# Patient Record
Sex: MALE | URBAN - METROPOLITAN AREA
[De-identification: names, ages, dates, MRNs, and addresses within clinical notes are randomized per-mention and may not be internally consistent; named-entity substitution may affect disease eponyms.]

---

## 2019-03-26 ENCOUNTER — APPOINTMENT (RX ONLY)
Dept: URBAN - METROPOLITAN AREA CLINIC 321 | Facility: CLINIC | Age: 74
Setting detail: DERMATOLOGY
End: 2019-03-26

## 2019-03-26 DIAGNOSIS — D17 BENIGN LIPOMATOUS NEOPLASM: ICD-10-CM

## 2019-03-26 DIAGNOSIS — D18.0 HEMANGIOMA: ICD-10-CM

## 2019-03-26 DIAGNOSIS — D22 MELANOCYTIC NEVI: ICD-10-CM

## 2019-03-26 DIAGNOSIS — L82.1 OTHER SEBORRHEIC KERATOSIS: ICD-10-CM

## 2019-03-26 DIAGNOSIS — L81.4 OTHER MELANIN HYPERPIGMENTATION: ICD-10-CM

## 2019-03-26 DIAGNOSIS — L57.0 ACTINIC KERATOSIS: ICD-10-CM

## 2019-03-26 DIAGNOSIS — L57.8 OTHER SKIN CHANGES DUE TO CHRONIC EXPOSURE TO NONIONIZING RADIATION: ICD-10-CM

## 2019-03-26 DIAGNOSIS — L82.0 INFLAMED SEBORRHEIC KERATOSIS: ICD-10-CM

## 2019-03-26 PROBLEM — D17.22 BENIGN LIPOMATOUS NEOPLASM OF SKIN AND SUBCUTANEOUS TISSUE OF LEFT ARM: Status: ACTIVE | Noted: 2019-03-26

## 2019-03-26 PROBLEM — D22.9 MELANOCYTIC NEVI, UNSPECIFIED: Status: ACTIVE | Noted: 2019-03-26

## 2019-03-26 PROBLEM — D18.01 HEMANGIOMA OF SKIN AND SUBCUTANEOUS TISSUE: Status: ACTIVE | Noted: 2019-03-26

## 2019-03-26 PROBLEM — L85.3 XEROSIS CUTIS: Status: ACTIVE | Noted: 2019-03-26

## 2019-03-26 PROCEDURE — ? COUNSELING

## 2019-03-26 PROCEDURE — ? LIQUID NITROGEN

## 2019-03-26 PROCEDURE — 17000 DESTRUCT PREMALG LESION: CPT | Mod: 59

## 2019-03-26 PROCEDURE — 17110 DESTRUCTION B9 LES UP TO 14: CPT

## 2019-03-26 PROCEDURE — 17003 DESTRUCT PREMALG LES 2-14: CPT

## 2019-03-26 PROCEDURE — ? INVENTORY

## 2019-03-26 PROCEDURE — 99213 OFFICE O/P EST LOW 20 MIN: CPT | Mod: 25

## 2019-03-26 ASSESSMENT — LOCATION SIMPLE DESCRIPTION DERM
LOCATION SIMPLE: ABDOMEN
LOCATION SIMPLE: CHEST
LOCATION SIMPLE: LEFT EAR
LOCATION SIMPLE: RIGHT UPPER BACK
LOCATION SIMPLE: LEFT UPPER ARM
LOCATION SIMPLE: RIGHT AXILLARY VAULT
LOCATION SIMPLE: RIGHT FOREARM
LOCATION SIMPLE: LEFT FOREHEAD
LOCATION SIMPLE: RIGHT EAR
LOCATION SIMPLE: LEFT SHOULDER
LOCATION SIMPLE: LEFT UPPER BACK
LOCATION SIMPLE: UPPER BACK

## 2019-03-26 ASSESSMENT — LOCATION DETAILED DESCRIPTION DERM
LOCATION DETAILED: RIGHT VENTRAL DISTAL FOREARM
LOCATION DETAILED: RIGHT RIB CAGE
LOCATION DETAILED: EPIGASTRIC SKIN
LOCATION DETAILED: LEFT LATERAL INFERIOR CHEST
LOCATION DETAILED: LEFT SCAPHA
LOCATION DETAILED: RIGHT SUPERIOR UPPER BACK
LOCATION DETAILED: RIGHT AXILLARY VAULT
LOCATION DETAILED: SUPERIOR THORACIC SPINE
LOCATION DETAILED: RIGHT ANTITRAGUS
LOCATION DETAILED: SUBXIPHOID
LOCATION DETAILED: LEFT POSTERIOR SHOULDER
LOCATION DETAILED: LEFT INFERIOR LATERAL FOREHEAD
LOCATION DETAILED: RIGHT LATERAL SUPERIOR CHEST
LOCATION DETAILED: LEFT INFERIOR UPPER BACK
LOCATION DETAILED: LEFT ANTERIOR PROXIMAL UPPER ARM
LOCATION DETAILED: LEFT MEDIAL SUPERIOR CHEST

## 2019-03-26 ASSESSMENT — LOCATION ZONE DERM
LOCATION ZONE: AXILLAE
LOCATION ZONE: ARM
LOCATION ZONE: FACE
LOCATION ZONE: EAR
LOCATION ZONE: TRUNK

## 2019-03-26 NOTE — PROCEDURE: LIQUID NITROGEN
Medical Necessity Clause: This procedure was medically necessary because the lesions that were treated were:
Add 52 Modifier (Optional): no
Render Post-Care Instructions In Note?: yes
Post-Care Instructions: I reviewed with the patient in detail post-care instructions. Patient is to wear sunprotection, and avoid picking at any of the treated lesions. Pt may apply Vaseline to crusted or scabbing areas. The patient was instructed to re-check treated lesion in one month at home to insure that area is gone. If not,  contact us for further evaluation.
Medical Necessity Information: It is in your best interest to select a reason for this procedure from the list below. All of these items fulfill various CMS LCD requirements except the new and changing color options.
Consent: The patient's consent was obtained including but not limited to risks of crusting, scabbing, blistering, scarring, darker or lighter pigmentary change, recurrence, incomplete removal and infection.
Detail Level: Simple
Detail Level: Zone
Post-Care Instructions: I reviewed with the patient in detail post-care instructions. Patient is to wear sunprotection, and avoid picking at any of the treated lesions. Pt may apply Vaseline to crusted or scabbing areas. The patient was instructed to check treated area in one month to insure that the area is gone.  If not , rtc for further evaluation.
Duration Of Freeze Thaw-Cycle (Seconds): 0

## 2019-04-23 ENCOUNTER — APPOINTMENT (RX ONLY)
Dept: URBAN - METROPOLITAN AREA CLINIC 321 | Facility: CLINIC | Age: 74
Setting detail: DERMATOLOGY
End: 2019-04-23

## 2019-04-23 DIAGNOSIS — D17 BENIGN LIPOMATOUS NEOPLASM: ICD-10-CM

## 2019-04-23 DIAGNOSIS — L72.0 EPIDERMAL CYST: ICD-10-CM

## 2019-04-23 DIAGNOSIS — L82.0 INFLAMED SEBORRHEIC KERATOSIS: ICD-10-CM

## 2019-04-23 PROBLEM — D17.22 BENIGN LIPOMATOUS NEOPLASM OF SKIN AND SUBCUTANEOUS TISSUE OF LEFT ARM: Status: ACTIVE | Noted: 2019-04-23

## 2019-04-23 PROCEDURE — ? COUNSELING

## 2019-04-23 PROCEDURE — 99213 OFFICE O/P EST LOW 20 MIN: CPT | Mod: 25

## 2019-04-23 PROCEDURE — 17110 DESTRUCTION B9 LES UP TO 14: CPT

## 2019-04-23 PROCEDURE — ? LIQUID NITROGEN

## 2019-04-23 ASSESSMENT — LOCATION DETAILED DESCRIPTION DERM
LOCATION DETAILED: RIGHT SUPERIOR MEDIAL UPPER BACK
LOCATION DETAILED: LEFT PROXIMAL POSTERIOR UPPER ARM
LOCATION DETAILED: LEFT SUPERIOR MEDIAL UPPER BACK
LOCATION DETAILED: LEFT MID-UPPER BACK
LOCATION DETAILED: RIGHT SUPERIOR LATERAL UPPER BACK
LOCATION DETAILED: LEFT SUPERIOR UPPER BACK
LOCATION DETAILED: RIGHT INFERIOR UPPER BACK
LOCATION DETAILED: PERIUMBILICAL SKIN

## 2019-04-23 ASSESSMENT — LOCATION SIMPLE DESCRIPTION DERM
LOCATION SIMPLE: LEFT UPPER BACK
LOCATION SIMPLE: ABDOMEN
LOCATION SIMPLE: LEFT UPPER ARM
LOCATION SIMPLE: RIGHT UPPER BACK

## 2019-04-23 ASSESSMENT — LOCATION ZONE DERM
LOCATION ZONE: ARM
LOCATION ZONE: TRUNK

## 2019-04-23 NOTE — HPI: SUBCUTANEOUS GROWTH
How Severe Is Your Growth?: mild
Has Your Growth Been Treated?: not been treated
Is This A New Presentation, Or A Follow-Up?: Subcutaneous Growths
Additional History: Pt states his oncologist had lumps X-ray but neg results

## 2019-04-23 NOTE — PROCEDURE: LIQUID NITROGEN
Medical Necessity Clause: This procedure was medically necessary because the lesions that were treated were:
Add 52 Modifier (Optional): no
Render Post-Care Instructions In Note?: yes
Post-Care Instructions: I reviewed with the patient in detail post-care instructions. Patient is to wear sunprotection, and avoid picking at any of the treated lesions. Pt may apply Vaseline to crusted or scabbing areas. The patient was instructed to re-check treated lesion in one month at home to insure that area is gone. If not,  contact us for further evaluation.
Medical Necessity Information: It is in your best interest to select a reason for this procedure from the list below. All of these items fulfill various CMS LCD requirements except the new and changing color options.
Detail Level: Simple
Consent: The patient's consent was obtained including but not limited to risks of crusting, scabbing, blistering, scarring, darker or lighter pigmentary change, recurrence, incomplete removal and infection.

## 2019-09-20 ENCOUNTER — NON-APPOINTMENT (OUTPATIENT)
Age: 74
End: 2019-09-20

## 2019-09-20 ENCOUNTER — APPOINTMENT (OUTPATIENT)
Dept: CARDIOLOGY | Facility: CLINIC | Age: 74
End: 2019-09-20
Payer: MEDICARE

## 2019-09-20 VITALS
HEIGHT: 72 IN | BODY MASS INDEX: 29.26 KG/M2 | DIASTOLIC BLOOD PRESSURE: 60 MMHG | WEIGHT: 216 LBS | OXYGEN SATURATION: 98 % | SYSTOLIC BLOOD PRESSURE: 92 MMHG | HEART RATE: 54 BPM

## 2019-09-20 DIAGNOSIS — Z86.79 PERSONAL HISTORY OF OTHER DISEASES OF THE CIRCULATORY SYSTEM: ICD-10-CM

## 2019-09-20 DIAGNOSIS — Z85.9 PERSONAL HISTORY OF MALIGNANT NEOPLASM, UNSPECIFIED: ICD-10-CM

## 2019-09-20 DIAGNOSIS — Z92.21 PERSONAL HISTORY OF ANTINEOPLASTIC CHEMOTHERAPY: ICD-10-CM

## 2019-09-20 DIAGNOSIS — Z78.9 OTHER SPECIFIED HEALTH STATUS: ICD-10-CM

## 2019-09-20 DIAGNOSIS — Z80.9 FAMILY HISTORY OF MALIGNANT NEOPLASM, UNSPECIFIED: ICD-10-CM

## 2019-09-20 DIAGNOSIS — Z87.891 PERSONAL HISTORY OF NICOTINE DEPENDENCE: ICD-10-CM

## 2019-09-20 DIAGNOSIS — Z60.2 PROBLEMS RELATED TO LIVING ALONE: ICD-10-CM

## 2019-09-20 DIAGNOSIS — I25.2 OLD MYOCARDIAL INFARCTION: ICD-10-CM

## 2019-09-20 PROCEDURE — 99205 OFFICE O/P NEW HI 60 MIN: CPT

## 2019-09-20 PROCEDURE — 93000 ELECTROCARDIOGRAM COMPLETE: CPT

## 2019-09-20 SDOH — SOCIAL STABILITY - SOCIAL INSECURITY: PROBLEMS RELATED TO LIVING ALONE: Z60.2

## 2019-09-20 NOTE — PHYSICAL EXAM
[General Appearance - Well Developed] : well developed [Normal Appearance] : normal appearance [Well Groomed] : well groomed [General Appearance - Well Nourished] : well nourished [No Deformities] : no deformities [General Appearance - In No Acute Distress] : no acute distress [Normal Conjunctiva] : the conjunctiva exhibited no abnormalities [Eyelids - No Xanthelasma] : the eyelids demonstrated no xanthelasmas [Normal Oral Mucosa] : normal oral mucosa [No Oral Pallor] : no oral pallor [No Oral Cyanosis] : no oral cyanosis [Normal Jugular Venous A Waves Present] : normal jugular venous A waves present [Normal Jugular Venous V Waves Present] : normal jugular venous V waves present [No Jugular Venous Hensley A Waves] : no jugular venous hensley A waves [Respiration, Rhythm And Depth] : normal respiratory rhythm and effort [Exaggerated Use Of Accessory Muscles For Inspiration] : no accessory muscle use [Auscultation Breath Sounds / Voice Sounds] : lungs were clear to auscultation bilaterally [Heart Rate And Rhythm] : heart rate and rhythm were normal [Heart Sounds] : normal S1 and S2 [Murmurs] : no murmurs present [Abdomen Soft] : soft [Abdomen Tenderness] : non-tender [Abdomen Mass (___ Cm)] : no abdominal mass palpated [Abnormal Walk] : normal gait [Gait - Sufficient For Exercise Testing] : the gait was sufficient for exercise testing [Nail Clubbing] : no clubbing of the fingernails [Cyanosis, Localized] : no localized cyanosis [Petechial Hemorrhages (___cm)] : no petechial hemorrhages [Skin Color & Pigmentation] : normal skin color and pigmentation [] : no rash [No Venous Stasis] : no venous stasis [Skin Lesions] : no skin lesions [No Skin Ulcers] : no skin ulcer [No Xanthoma] : no  xanthoma was observed [Oriented To Time, Place, And Person] : oriented to person, place, and time [Affect] : the affect was normal [Mood] : the mood was normal [No Anxiety] : not feeling anxious

## 2019-09-20 NOTE — ASSESSMENT
[FreeTextEntry1] : There is a high complexity to the medical decision making in this case.  There is an extensive number of diagnostic or management options regarding the chief complaint.  These include cardiac catheterization, EP study, Holter monitor, and stress test . The chief complaint is an illness which causes threat to life.\par \par All of the data entered by staff today into the electronic health record  has been reviewed.  I am in agreement with all of the data.\par \par CAD: The patient recently was told of congestive heart failure. Patient has evidence of myocardial infarction by ECG. The patient has depressed lv fxn by  prior left ventriculography.. Patient will not be able to exercise heart rate adequate for diagnostic purposes because of beta blockade therapy. Overall pharmacologic nuclear stress testing his best option. Transthoracic echocardiography is indicated to reassess LV function.\par \par I have advised the patient that propranolol is a suboptimal beta blocker choice. Given the patient's excellent clinical status we have elected to continue current therapy.

## 2019-09-20 NOTE — HISTORY OF PRESENT ILLNESS
[FreeTextEntry1] : The patient is seen because of coronary artery disease.\par \par Patient has a long history of coronary artery disease and cardiomyopathy. Status post coronary stenting. He is status post coronary stenting. The patient enjoys fairly good exercise ability. He is able to walk 10 minutes without exertional chest discomfort or shortness of breath.\par \par The patient was recently told that he suffers from congestive heart failure. He was treated with En patient denies any symptoms.\par \par The patient had lung cancer diagnosed in 2007 with metastases to the brain is 8. He is status post multiple radiological features to the brain. casey

## 2019-10-31 ENCOUNTER — APPOINTMENT (OUTPATIENT)
Dept: CARDIOLOGY | Facility: CLINIC | Age: 74
End: 2019-10-31
Payer: MEDICARE

## 2019-10-31 PROCEDURE — A9502: CPT

## 2019-10-31 PROCEDURE — 93015 CV STRESS TEST SUPVJ I&R: CPT

## 2019-10-31 PROCEDURE — 93306 TTE W/DOPPLER COMPLETE: CPT

## 2019-10-31 PROCEDURE — 78452 HT MUSCLE IMAGE SPECT MULT: CPT

## 2019-11-01 ENCOUNTER — APPOINTMENT (OUTPATIENT)
Dept: CARDIOLOGY | Facility: CLINIC | Age: 74
End: 2019-11-01
Payer: MEDICARE

## 2019-11-01 VITALS
WEIGHT: 222 LBS | HEIGHT: 72 IN | SYSTOLIC BLOOD PRESSURE: 104 MMHG | BODY MASS INDEX: 30.07 KG/M2 | HEART RATE: 63 BPM | OXYGEN SATURATION: 99 % | DIASTOLIC BLOOD PRESSURE: 62 MMHG

## 2019-11-01 PROCEDURE — 99214 OFFICE O/P EST MOD 30 MIN: CPT

## 2019-11-01 NOTE — ASSESSMENT
[FreeTextEntry1] : CHF:  He was told by MD in [place] he had congestive heart failure. The patient denies any recent symptoms of chest pain, shortness of breath, PND or orthopnea. His recent testing has been reviewed with him. Overall the patient declines invasive evaluation. He wishes to proceed with rehabilitation.\par \par Hypertension: Well controlled\par \par Hyperlipidemia: The patient has been on current statin therapy for many years. Quite stable. Elected to continue   current pharmacologic therapy.

## 2019-11-01 NOTE — PHYSICAL EXAM
[General Appearance - Well Developed] : well developed [Normal Appearance] : normal appearance [Well Groomed] : well groomed [General Appearance - Well Nourished] : well nourished [No Deformities] : no deformities [General Appearance - In No Acute Distress] : no acute distress [Normal Conjunctiva] : the conjunctiva exhibited no abnormalities [Eyelids - No Xanthelasma] : the eyelids demonstrated no xanthelasmas [Normal Oral Mucosa] : normal oral mucosa [No Oral Pallor] : no oral pallor [No Oral Cyanosis] : no oral cyanosis [Normal Jugular Venous A Waves Present] : normal jugular venous A waves present [Normal Jugular Venous V Waves Present] : normal jugular venous V waves present [No Jugular Venous Hensley A Waves] : no jugular venous hensley A waves [] : no respiratory distress [Respiration, Rhythm And Depth] : normal respiratory rhythm and effort [Exaggerated Use Of Accessory Muscles For Inspiration] : no accessory muscle use [Auscultation Breath Sounds / Voice Sounds] : lungs were clear to auscultation bilaterally [Heart Rate And Rhythm] : heart rate and rhythm were normal [Heart Sounds] : normal S1 and S2 [Murmurs] : no murmurs present

## 2019-11-01 NOTE — REASON FOR VISIT
[Follow-Up - Clinic] : a clinic follow-up of [Coronary Artery Disease] : coronary artery disease [FreeTextEntry1] : CHF:  He was told he had congestive heart failure by MD in [place]. The patient denies any recent symptoms of chest pain, shortness of breath, PND or orthopnea. His recent testing has been reviewed with him. Overall the patient declines invasive evaluation. He wishes to proceed with rehabilitation.\par \par Hypertension: Well controlled\par \par Hyperlipidemia: The patient has been on current statin therapy for many years. Quite stable. Elected to continue   current pharmacologic therapy.

## 2020-02-10 ENCOUNTER — APPOINTMENT (OUTPATIENT)
Dept: CARDIOLOGY | Facility: CLINIC | Age: 75
End: 2020-02-10
Payer: MEDICARE

## 2020-02-10 VITALS
BODY MASS INDEX: 30.07 KG/M2 | HEIGHT: 72 IN | SYSTOLIC BLOOD PRESSURE: 110 MMHG | WEIGHT: 222 LBS | HEART RATE: 56 BPM | DIASTOLIC BLOOD PRESSURE: 70 MMHG | OXYGEN SATURATION: 99 %

## 2020-02-10 PROCEDURE — 99214 OFFICE O/P EST MOD 30 MIN: CPT

## 2020-02-10 NOTE — HISTORY OF PRESENT ILLNESS
[FreeTextEntry1] : CHF: The patient is maintained on Entresto.  basic metabolic panel has been arranged. The patient is well compensated. The patient declines any further investigation in this regard. The risks and benefits AICD therapy have been reviewed. The patient declines.\par \par Hypertension: Well controlled.\par \par Hyperlipidemia: The patient is tolerating statin therapy without difficulty.\par \par CAD:  tolerating asa 81 daily.\par \par Lung Ca w brain mets:  s/p mult cyberknife procedures.

## 2020-02-10 NOTE — PHYSICAL EXAM
[Normal Appearance] : normal appearance [General Appearance - Well Developed] : well developed [General Appearance - Well Nourished] : well nourished [Well Groomed] : well groomed [No Deformities] : no deformities [General Appearance - In No Acute Distress] : no acute distress [Normal Conjunctiva] : the conjunctiva exhibited no abnormalities [Eyelids - No Xanthelasma] : the eyelids demonstrated no xanthelasmas [Normal Oral Mucosa] : normal oral mucosa [Normal Jugular Venous A Waves Present] : normal jugular venous A waves present [No Oral Pallor] : no oral pallor [No Oral Cyanosis] : no oral cyanosis [No Jugular Venous Hensley A Waves] : no jugular venous hensley A waves [Normal Jugular Venous V Waves Present] : normal jugular venous V waves present [] : no respiratory distress [Respiration, Rhythm And Depth] : normal respiratory rhythm and effort [Exaggerated Use Of Accessory Muscles For Inspiration] : no accessory muscle use [Heart Rate And Rhythm] : heart rate and rhythm were normal [Auscultation Breath Sounds / Voice Sounds] : lungs were clear to auscultation bilaterally [Murmurs] : no murmurs present [Heart Sounds] : normal S1 and S2

## 2020-02-12 ENCOUNTER — EMERGENCY (EMERGENCY)
Facility: HOSPITAL | Age: 75
LOS: 1 days | End: 2020-02-12
Admitting: EMERGENCY MEDICINE
Payer: MEDICARE

## 2020-02-12 PROCEDURE — 99284 EMERGENCY DEPT VISIT MOD MDM: CPT

## 2020-03-13 ENCOUNTER — APPOINTMENT (OUTPATIENT)
Dept: CT IMAGING | Facility: CLINIC | Age: 75
End: 2020-03-13
Payer: MEDICARE

## 2020-03-13 PROCEDURE — Q9967B: CUSTOM

## 2020-03-13 PROCEDURE — 82565A: CUSTOM | Mod: QW

## 2020-03-13 PROCEDURE — 74177 CT ABD & PELVIS W/CONTRAST: CPT

## 2020-03-13 PROCEDURE — 71260 CT THORAX DX C+: CPT

## 2020-03-19 ENCOUNTER — APPOINTMENT (OUTPATIENT)
Dept: CARDIOLOGY | Facility: CLINIC | Age: 75
End: 2020-03-19
Payer: MEDICARE

## 2020-03-19 ENCOUNTER — NON-APPOINTMENT (OUTPATIENT)
Age: 75
End: 2020-03-19

## 2020-03-19 VITALS
HEART RATE: 90 BPM | BODY MASS INDEX: 28.04 KG/M2 | HEIGHT: 72 IN | SYSTOLIC BLOOD PRESSURE: 120 MMHG | DIASTOLIC BLOOD PRESSURE: 70 MMHG | WEIGHT: 207 LBS | OXYGEN SATURATION: 95 %

## 2020-03-19 DIAGNOSIS — Z00.00 ENCOUNTER FOR GENERAL ADULT MEDICAL EXAMINATION W/OUT ABNORMAL FINDINGS: ICD-10-CM

## 2020-03-19 DIAGNOSIS — G20 PARKINSON'S DISEASE: ICD-10-CM

## 2020-03-19 PROCEDURE — 93000 ELECTROCARDIOGRAM COMPLETE: CPT

## 2020-03-19 PROCEDURE — 99215 OFFICE O/P EST HI 40 MIN: CPT

## 2020-03-19 RX ORDER — DICYCLOMINE HYDROCHLORIDE 10 MG/1
10 CAPSULE ORAL
Qty: 90 | Refills: 0 | Status: ACTIVE | COMMUNITY
Start: 2020-03-17

## 2020-03-19 RX ORDER — CLONAZEPAM 1 MG/1
1 TABLET ORAL
Qty: 90 | Refills: 0 | Status: ACTIVE | COMMUNITY
Start: 2020-02-06

## 2020-03-19 NOTE — HISTORY OF PRESENT ILLNESS
[Preoperative Visit] : for a medical evaluation prior to surgery [Scheduled Procedure ___] : a [unfilled] [Date of Surgery ___] : on [unfilled] [Surgeon Name ___] : surgeon: [unfilled] [Stable] : Stable [Fatigue] : fatigue [Poor Exercise Tolerance] : poor exercise tolerance [Cardiovascular Disease] : cardiovascular disease [Anti-Platelet Agents] : anti-platelet agents [GI Disease] : gastrointestinal disease [Thromboembolic Problems] : thromboembolic problems [Frequent Aspirin Use] : frequent aspirin use [Prior Anesthesia] : Prior anesthesia [Electrocardiogram] : ~T an ECG ~C was performed [Echocardiogram] : ~T an echocardiogram ~C was performed [Cardiac Catheterization  (Diagnostic)] : cardiac catheterization ~T ~C was performed [Fever] : no fever [Chills] : no chills [Chest Pain] : no chest pain [Cough] : no cough [Dyspnea] : no dyspnea [Dysuria] : no dysuria [Urinary Frequency] : no urinary frequency [Nausea] : no nausea [Vomiting] : no vomiting [Diarrhea] : no diarrhea [Abdominal Pain] : no abdominal pain [Easy Bruising] : no easy bruising [Lower Extremity Swelling] : no lower extremity swelling [Diabetes] : no diabetes [Pulmonary Disease] : no pulmonary disease [Nicotine Dependence] : no nicotine dependence [Alcohol Use] : no  alcohol use [Renal Disease] : no renal disease [Sleep Apnea] : no sleep apnea [Frequent use of NSAIDs] : no use of NSAIDs [Transfusion Reaction] : no transfusion reaction [Impaired Immunity] : no impaired immunity [Steroid Use in Last 6 Months] : no steroid use in the last six months [Prev Anesthesia Reaction] : no previous anesthesia reaction [Anesthesia Reaction] : no anesthesia reaction [Sudden Death] : no sudden death [Clotting Disorder] : no clotting disorder [Bleeding Disorder] : no bleeding disorder [FreeTextEntry1] : CHF: The patient is maintained on Entresto.  The patient is well compensated. The patient declines any further investigation in this regard. The risks and benefits AICD therapy have been reviewed. The patient declines.\par \par Hypertension: Well controlled.\par \par Hyperlipidemia: The patient is tolerating statin therapy without difficulty.\par \par CAD:  tolerating asa 81 daily.\par \par Lung Ca w brain mets:  s/p mult cyberknife procedures.  \par \par he has no chest pain\par He has no shortness of breath\par He has no palpitations\par He has no syncope\par He is neurologically intact\par He has no edema\par He has no GI symptoms\par

## 2020-03-19 NOTE — DISCUSSION/SUMMARY
[Procedure Low Risk] : the procedure risk is low [Patient Intermediate Risk] : the patient is an intermediate risk [Optimized for Surgery] : the patient is optimized for surgery [As per surgery] : as per surgery [Continue] : Continue medications as currently directed [FreeTextEntry1] : Cleared for colonoscopy\par Must remain on aspirin 81mg ( stents)

## 2020-03-20 ENCOUNTER — OUTPATIENT (OUTPATIENT)
Dept: OUTPATIENT SERVICES | Facility: HOSPITAL | Age: 75
LOS: 1 days | End: 2020-03-20

## 2020-09-08 ENCOUNTER — APPOINTMENT (OUTPATIENT)
Dept: CARDIOLOGY | Facility: CLINIC | Age: 75
End: 2020-09-08
Payer: MEDICARE

## 2020-09-08 VITALS
TEMPERATURE: 96.9 F | DIASTOLIC BLOOD PRESSURE: 64 MMHG | WEIGHT: 205 LBS | HEIGHT: 72 IN | HEART RATE: 54 BPM | SYSTOLIC BLOOD PRESSURE: 102 MMHG | OXYGEN SATURATION: 98 % | BODY MASS INDEX: 27.77 KG/M2

## 2020-09-08 PROCEDURE — 99213 OFFICE O/P EST LOW 20 MIN: CPT

## 2020-09-08 NOTE — HISTORY OF PRESENT ILLNESS
[FreeTextEntry1] : CAD: Patient status post coronary stenting.  The patient has fairly good exercise ability without exertional symptoms.\par \par Hyperlipidemia: Patient has been tolerating statin therapy for quite some time will continue current therapy.\par \par Hypertension: Well-controlled.\par \par Cardiomyopathy: The risk minutes of further investigation have been reviewed.  Overall the patient declines.

## 2020-09-29 ENCOUNTER — RX RENEWAL (OUTPATIENT)
Age: 75
End: 2020-09-29

## 2020-11-24 ENCOUNTER — APPOINTMENT (OUTPATIENT)
Dept: MRI IMAGING | Facility: CLINIC | Age: 75
End: 2020-11-24
Payer: MEDICARE

## 2020-11-24 PROCEDURE — A9585A: CUSTOM

## 2020-11-24 PROCEDURE — 70553 MRI BRAIN STEM W/O & W/DYE: CPT | Mod: MH

## 2021-01-05 ENCOUNTER — NON-APPOINTMENT (OUTPATIENT)
Age: 76
End: 2021-01-05

## 2021-02-25 ENCOUNTER — RX RENEWAL (OUTPATIENT)
Age: 76
End: 2021-02-25

## 2021-03-17 ENCOUNTER — APPOINTMENT (OUTPATIENT)
Dept: MRI IMAGING | Facility: CLINIC | Age: 76
End: 2021-03-17
Payer: MEDICARE

## 2021-03-17 PROCEDURE — A9585A: CUSTOM

## 2021-03-17 PROCEDURE — 70553 MRI BRAIN STEM W/O & W/DYE: CPT | Mod: MH

## 2021-03-17 PROCEDURE — A9585: CPT | Mod: JW

## 2021-08-31 ENCOUNTER — APPOINTMENT (OUTPATIENT)
Dept: MRI IMAGING | Facility: CLINIC | Age: 76
End: 2021-08-31
Payer: MEDICARE

## 2021-08-31 PROCEDURE — 70553 MRI BRAIN STEM W/O & W/DYE: CPT | Mod: MH

## 2021-08-31 PROCEDURE — A9585: CPT

## 2021-11-12 ENCOUNTER — APPOINTMENT (OUTPATIENT)
Dept: CARDIOLOGY | Facility: CLINIC | Age: 76
End: 2021-11-12
Payer: MEDICARE

## 2021-11-12 VITALS
HEART RATE: 63 BPM | OXYGEN SATURATION: 97 % | HEIGHT: 72 IN | SYSTOLIC BLOOD PRESSURE: 98 MMHG | BODY MASS INDEX: 31.83 KG/M2 | WEIGHT: 235 LBS | TEMPERATURE: 97 F | DIASTOLIC BLOOD PRESSURE: 64 MMHG

## 2021-11-12 PROCEDURE — 93000 ELECTROCARDIOGRAM COMPLETE: CPT

## 2021-11-12 PROCEDURE — 99214 OFFICE O/P EST MOD 30 MIN: CPT

## 2021-11-12 RX ORDER — CARBIDOPA AND LEVODOPA 25; 100 MG/1; MG/1
25-100 TABLET ORAL 3 TIMES DAILY
Refills: 0 | Status: DISCONTINUED | COMMUNITY
End: 2021-11-12

## 2021-11-12 NOTE — HISTORY OF PRESENT ILLNESS
[FreeTextEntry1] : CAD: The patient is status post multivessel coronary intervention.  He is tolerating aspirin therapy.  The patient has some dyspnea on heavy exertion.  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  The risk and benefits of further cardiac evaluation and treatment have been reviewed.  The patient declines at this time but will consider it in the future.\par \par Hypertension: Well-controlled.

## 2022-01-03 ENCOUNTER — NON-APPOINTMENT (OUTPATIENT)
Age: 77
End: 2022-01-03

## 2022-01-11 ENCOUNTER — NON-APPOINTMENT (OUTPATIENT)
Age: 77
End: 2022-01-11

## 2022-02-15 ENCOUNTER — APPOINTMENT (OUTPATIENT)
Dept: MRI IMAGING | Facility: CLINIC | Age: 77
End: 2022-02-15
Payer: MEDICARE

## 2022-02-15 PROCEDURE — 70553 MRI BRAIN STEM W/O & W/DYE: CPT

## 2022-02-15 PROCEDURE — A9585: CPT

## 2022-02-21 RX ORDER — SACUBITRIL AND VALSARTAN 97; 103 MG/1; MG/1
97-103 TABLET, FILM COATED ORAL TWICE DAILY
Qty: 180 | Refills: 3 | Status: ACTIVE | COMMUNITY
Start: 1900-01-01 | End: 1900-01-01

## 2022-09-10 ENCOUNTER — EMERGENCY (EMERGENCY)
Facility: HOSPITAL | Age: 77
LOS: 1 days | Discharge: HOME | End: 2022-09-10

## 2022-09-10 DIAGNOSIS — R33.9 RETENTION OF URINE, UNSPECIFIED: ICD-10-CM

## 2022-09-10 DIAGNOSIS — I50.9 HEART FAILURE, UNSPECIFIED: ICD-10-CM

## 2022-09-10 PROCEDURE — 99284 EMERGENCY DEPT VISIT MOD MDM: CPT

## 2022-09-12 ENCOUNTER — APPOINTMENT (OUTPATIENT)
Dept: UROLOGY | Facility: CLINIC | Age: 77
End: 2022-09-12

## 2022-09-12 VITALS
HEART RATE: 83 BPM | HEIGHT: 72 IN | TEMPERATURE: 97.9 F | BODY MASS INDEX: 31.83 KG/M2 | SYSTOLIC BLOOD PRESSURE: 124 MMHG | DIASTOLIC BLOOD PRESSURE: 83 MMHG | WEIGHT: 235 LBS

## 2022-09-12 PROCEDURE — 99204 OFFICE O/P NEW MOD 45 MIN: CPT

## 2022-09-12 NOTE — ASSESSMENT
[FreeTextEntry1] : Plan\par increase tamsulosin 2 capsules a day\par c/w dutasteride\par fu Thursday AM TOV

## 2022-09-12 NOTE — PHYSICAL EXAM

## 2022-09-12 NOTE — HISTORY OF PRESENT ILLNESS
[FreeTextEntry1] : Mr. DANICA ETIENNE 77 year old  M  PMH LUTS and urinary retention, HLD, CHF and PSH brain tumor. Pt comes in bc he had a catheter placed on Saturday at [facility]. Pt has a a history of urinary retention. This happening in 2007. Pt currently on flomax and dutasteride. Pt has been on this for 5 years. Friday night had a beer which isn't uncommon. Pt went to ED Saturday morning. \par

## 2022-09-15 ENCOUNTER — APPOINTMENT (OUTPATIENT)
Dept: UROLOGY | Facility: CLINIC | Age: 77
End: 2022-09-15

## 2022-09-15 VITALS
SYSTOLIC BLOOD PRESSURE: 104 MMHG | WEIGHT: 235 LBS | HEIGHT: 72 IN | TEMPERATURE: 97.2 F | DIASTOLIC BLOOD PRESSURE: 67 MMHG | BODY MASS INDEX: 31.83 KG/M2 | HEART RATE: 66 BPM

## 2022-09-15 PROCEDURE — 51700 IRRIGATION OF BLADDER: CPT

## 2022-09-15 PROCEDURE — A4216: CPT | Mod: NC

## 2022-09-22 ENCOUNTER — APPOINTMENT (OUTPATIENT)
Dept: UROLOGY | Facility: CLINIC | Age: 77
End: 2022-09-22

## 2022-09-22 VITALS
TEMPERATURE: 97.4 F | SYSTOLIC BLOOD PRESSURE: 106 MMHG | DIASTOLIC BLOOD PRESSURE: 70 MMHG | BODY MASS INDEX: 31.97 KG/M2 | HEART RATE: 56 BPM | WEIGHT: 236 LBS | HEIGHT: 72 IN

## 2022-09-22 DIAGNOSIS — R33.8 OTHER RETENTION OF URINE: ICD-10-CM

## 2022-09-22 PROCEDURE — 51798 US URINE CAPACITY MEASURE: CPT

## 2022-09-22 PROCEDURE — 99213 OFFICE O/P EST LOW 20 MIN: CPT

## 2022-09-22 NOTE — HISTORY OF PRESENT ILLNESS
[FreeTextEntry1] : Pt comes in PVR following justin removal. Pt feels well and urinating well.\par \par PVR 30

## 2022-10-28 ENCOUNTER — RX RENEWAL (OUTPATIENT)
Age: 77
End: 2022-10-28

## 2022-11-08 ENCOUNTER — NON-APPOINTMENT (OUTPATIENT)
Age: 77
End: 2022-11-08

## 2022-12-08 ENCOUNTER — APPOINTMENT (OUTPATIENT)
Dept: CARDIOLOGY | Facility: CLINIC | Age: 77
End: 2022-12-08

## 2022-12-08 ENCOUNTER — NON-APPOINTMENT (OUTPATIENT)
Age: 77
End: 2022-12-08

## 2022-12-08 VITALS
OXYGEN SATURATION: 98 % | TEMPERATURE: 97.2 F | HEART RATE: 64 BPM | HEIGHT: 72 IN | BODY MASS INDEX: 29.53 KG/M2 | WEIGHT: 218 LBS | SYSTOLIC BLOOD PRESSURE: 130 MMHG | DIASTOLIC BLOOD PRESSURE: 74 MMHG

## 2022-12-08 DIAGNOSIS — E78.5 HYPERLIPIDEMIA, UNSPECIFIED: ICD-10-CM

## 2022-12-08 DIAGNOSIS — I50.22 CHRONIC SYSTOLIC (CONGESTIVE) HEART FAILURE: ICD-10-CM

## 2022-12-08 DIAGNOSIS — Z95.5 PRESENCE OF CORONARY ANGIOPLASTY IMPLANT AND GRAFT: ICD-10-CM

## 2022-12-08 PROCEDURE — 99214 OFFICE O/P EST MOD 30 MIN: CPT

## 2022-12-08 NOTE — ASSESSMENT
[FreeTextEntry1] : CAD: The patient status post coronary stenting.  There is no chest pain.  There is no shortness of breath.\par \par Lung cancer: The patient has non-small cell carcinoma.  There was metastatic disease to the brain in 2009.  The diagnosis was in 2008.  The patient is undergone multiple brain procedures including CyberKnife and surgical treatment.\par \par Hyperlipidemia: Continue statin therapy.\par \par Cardiomyopathy: The risk and benefits of further imaging and AICD consideration have been reviewed.  Overall we have elected to continue observation at this time given the overall clinical circumstance.

## 2023-01-03 ENCOUNTER — RX RENEWAL (OUTPATIENT)
Age: 78
End: 2023-01-03

## 2023-02-15 ENCOUNTER — APPOINTMENT (OUTPATIENT)
Dept: MRI IMAGING | Facility: CLINIC | Age: 78
End: 2023-02-15
Payer: MEDICARE

## 2023-02-15 PROCEDURE — 70553 MRI BRAIN STEM W/O & W/DYE: CPT

## 2023-02-15 PROCEDURE — A9585: CPT | Mod: JW

## 2023-03-21 ENCOUNTER — APPOINTMENT (OUTPATIENT)
Dept: UROLOGY | Facility: CLINIC | Age: 78
End: 2023-03-21
Payer: MEDICARE

## 2023-03-21 VITALS
TEMPERATURE: 98.3 F | HEART RATE: 52 BPM | HEIGHT: 72 IN | SYSTOLIC BLOOD PRESSURE: 101 MMHG | DIASTOLIC BLOOD PRESSURE: 69 MMHG | BODY MASS INDEX: 28.44 KG/M2 | WEIGHT: 210 LBS

## 2023-03-21 PROCEDURE — 99213 OFFICE O/P EST LOW 20 MIN: CPT

## 2023-03-21 NOTE — HISTORY OF PRESENT ILLNESS
[FreeTextEntry1] : Pt comes in follow up LUTS. Pt feels he is doing well and urinating well. Offers no complaints. \par \par PVR 16\par

## 2023-03-24 LAB
APPEARANCE: CLEAR
BACTERIA: ABNORMAL
BILIRUBIN URINE: NEGATIVE
BLOOD URINE: NEGATIVE
COLOR: YELLOW
GLUCOSE QUALITATIVE U: NEGATIVE
HYALINE CASTS: 0 /LPF
KETONES URINE: NEGATIVE
LEUKOCYTE ESTERASE URINE: ABNORMAL
MICROSCOPIC-UA: NORMAL
NITRITE URINE: POSITIVE
PH URINE: 5.5
PROTEIN URINE: NORMAL
RED BLOOD CELLS URINE: 1 /HPF
SPECIFIC GRAVITY URINE: 1.02
SQUAMOUS EPITHELIAL CELLS: 1 /HPF
UROBILINOGEN URINE: NORMAL
WHITE BLOOD CELLS URINE: 12 /HPF

## 2023-03-28 ENCOUNTER — APPOINTMENT (OUTPATIENT)
Dept: UROLOGY | Facility: CLINIC | Age: 78
End: 2023-03-28

## 2023-03-31 ENCOUNTER — RESULT CHARGE (OUTPATIENT)
Age: 78
End: 2023-03-31

## 2023-03-31 LAB
BILIRUB UR QL STRIP: NEGATIVE
CLARITY UR: CLEAR
COLLECTION METHOD: NORMAL
GLUCOSE UR-MCNC: NEGATIVE
HCG UR QL: 0.2 EU/DL
HGB UR QL STRIP.AUTO: NORMAL
KETONES UR-MCNC: NORMAL
LEUKOCYTE ESTERASE UR QL STRIP: NORMAL
NITRITE UR QL STRIP: POSITIVE
PH UR STRIP: 5.5
PROT UR STRIP-MCNC: 30
SP GR UR STRIP: 1.02

## 2023-04-07 LAB
APPEARANCE: ABNORMAL
BACTERIA UR CULT: ABNORMAL
BACTERIA: ABNORMAL
BILIRUBIN URINE: NEGATIVE
BLOOD URINE: ABNORMAL
COLOR: ABNORMAL
GLUCOSE QUALITATIVE U: NEGATIVE
HYALINE CASTS: 1 /LPF
KETONES URINE: NEGATIVE
LEUKOCYTE ESTERASE URINE: ABNORMAL
MICROSCOPIC-UA: NORMAL
NITRITE URINE: POSITIVE
PH URINE: 6
PROTEIN URINE: ABNORMAL
RED BLOOD CELLS URINE: 1 /HPF
SPECIFIC GRAVITY URINE: 1.03
SQUAMOUS EPITHELIAL CELLS: 0 /HPF
UROBILINOGEN URINE: NORMAL
WHITE BLOOD CELLS URINE: 21 /HPF

## 2023-06-27 ENCOUNTER — NON-APPOINTMENT (OUTPATIENT)
Age: 78
End: 2023-06-27

## 2023-07-24 ENCOUNTER — APPOINTMENT (OUTPATIENT)
Dept: CARDIOLOGY | Facility: CLINIC | Age: 78
End: 2023-07-24
Payer: MEDICARE

## 2023-07-24 VITALS
WEIGHT: 212 LBS | HEART RATE: 57 BPM | OXYGEN SATURATION: 97 % | DIASTOLIC BLOOD PRESSURE: 66 MMHG | SYSTOLIC BLOOD PRESSURE: 102 MMHG | HEIGHT: 72 IN | BODY MASS INDEX: 28.71 KG/M2

## 2023-07-24 DIAGNOSIS — I10 ESSENTIAL (PRIMARY) HYPERTENSION: ICD-10-CM

## 2023-07-24 DIAGNOSIS — I25.10 ATHEROSCLEROTIC HEART DISEASE OF NATIVE CORONARY ARTERY W/OUT ANGINA PECTORIS: ICD-10-CM

## 2023-07-24 PROCEDURE — 99213 OFFICE O/P EST LOW 20 MIN: CPT

## 2023-07-24 RX ORDER — DICYCLOMINE HYDROCHLORIDE 20 MG/1
20 TABLET ORAL
Qty: 180 | Refills: 0 | Status: DISCONTINUED | COMMUNITY
Start: 2022-11-17 | End: 2023-07-24

## 2023-07-24 RX ORDER — PRIMIDONE 50 MG/1
50 TABLET ORAL
Refills: 0 | Status: ACTIVE | COMMUNITY

## 2023-07-24 RX ORDER — SULFAMETHOXAZOLE AND TRIMETHOPRIM 800; 160 MG/1; MG/1
800-160 TABLET ORAL TWICE DAILY
Qty: 10 | Refills: 0 | Status: DISCONTINUED | COMMUNITY
Start: 2023-03-31 | End: 2023-07-24

## 2023-07-24 NOTE — REASON FOR VISIT
[FreeTextEntry1] : CAD: Patient status post coronary stenting.  The patient is good exercise capacity without exertional symptoms.\par \par Hypertension: Well-controlled.\par \par Hyperlipidemia: Patient states he had LFTs and cholesterol profile done at [facility].  He will attempt to retrieve the results for me.\par \par Cardiomyopathy: Overall the patient I have elected to continue observation at this time.  We have elected against further imaging.

## 2023-07-24 NOTE — ASSESSMENT
[FreeTextEntry1] : CAD: Patient status post coronary stenting.  The patient is good exercise capacity without exertional symptoms.\par \par Hypertension: Well-controlled.\par \par Hyperlipidemia: Patient states he had LFTs and cholesterol profile done at [facility] [place] [facility].  He will attempt to retrieve the results for me.\par \par Cardiomyopathy: Overall the patient I have elected to continue observation at this time.  We have elected against further imaging.  We have elected against consideration of further invasive therapies or implantable devices.

## 2023-09-19 ENCOUNTER — APPOINTMENT (OUTPATIENT)
Dept: UROLOGY | Facility: CLINIC | Age: 78
End: 2023-09-19
Payer: MEDICARE

## 2023-09-19 VITALS
TEMPERATURE: 96 F | DIASTOLIC BLOOD PRESSURE: 68 MMHG | SYSTOLIC BLOOD PRESSURE: 103 MMHG | WEIGHT: 208 LBS | BODY MASS INDEX: 28.17 KG/M2 | HEIGHT: 72 IN | HEART RATE: 59 BPM | OXYGEN SATURATION: 98 % | RESPIRATION RATE: 16 BRPM

## 2023-09-19 PROCEDURE — 99213 OFFICE O/P EST LOW 20 MIN: CPT

## 2023-09-19 RX ORDER — DUTASTERIDE 0.5 MG/1
0.5 CAPSULE, LIQUID FILLED ORAL
Qty: 90 | Refills: 3 | Status: ACTIVE | COMMUNITY
Start: 1900-01-01 | End: 1900-01-01

## 2023-09-19 RX ORDER — TAMSULOSIN HYDROCHLORIDE 0.4 MG/1
0.4 CAPSULE ORAL
Qty: 180 | Refills: 3 | Status: ACTIVE | COMMUNITY
Start: 1900-01-01 | End: 1900-01-01

## 2023-10-30 ENCOUNTER — OFFICE (OUTPATIENT)
Dept: URBAN - METROPOLITAN AREA CLINIC 8 | Facility: CLINIC | Age: 78
Setting detail: OPHTHALMOLOGY
End: 2023-10-30
Payer: MEDICARE

## 2023-10-30 DIAGNOSIS — H01.001: ICD-10-CM

## 2023-10-30 DIAGNOSIS — H16.223: ICD-10-CM

## 2023-10-30 DIAGNOSIS — H52.4: ICD-10-CM

## 2023-10-30 DIAGNOSIS — H01.004: ICD-10-CM

## 2023-10-30 DIAGNOSIS — H25.13: ICD-10-CM

## 2023-10-30 PROCEDURE — 92014 COMPRE OPH EXAM EST PT 1/>: CPT | Performed by: OPHTHALMOLOGY

## 2023-10-30 PROCEDURE — 92015 DETERMINE REFRACTIVE STATE: CPT | Performed by: OPHTHALMOLOGY

## 2023-10-30 ASSESSMENT — KERATOMETRY
OS_AXISANGLE_DEGREES: 103
OD_AXISANGLE_DEGREES: 048
OD_K1POWER_DIOPTERS: 46.25
OD_K2POWER_DIOPTERS: 46.50
OS_K2POWER_DIOPTERS: 42.25
METHOD_AUTO_MANUAL: AUTO
OS_K1POWER_DIOPTERS: 41.00

## 2023-10-30 ASSESSMENT — REFRACTION_MANIFEST
OS_ADD: +2.75
OU_VA: 20/30-
OS_VA1: 20/30-
OD_SPHERE: PLANO
OD_ADD: +2.75
OS_AXIS: 090
OS_ADD: +3.00
OS_VA2: 20/20(J1+)
OD_VA1: 20/40+2
OS_CYLINDER: -1.75
OS_AXIS: 090
OD_VA2: 20/20(J1+)
OS_VA2: 20/20(J1+)
OS_SPHERE: PLANO
OD_ADD: +3.00
OD_CYLINDER: -1.50
OU_VA: 20/30-
OD_SPHERE: +0.50
OD_VA1: 20/40+2
OS_VA1: 20/30-
OS_SPHERE: +1.00
OS_CYLINDER: -1.75
OD_AXIS: 115
OD_CYLINDER: -2.00
OD_VA2: 20/20(J1+)
OD_AXIS: 115

## 2023-10-30 ASSESSMENT — SPHEQUIV_DERIVED
OD_SPHEQUIV: -0.75
OS_SPHEQUIV: 0.125
OD_SPHEQUIV: -0.25
OS_SPHEQUIV: -0.125

## 2023-10-30 ASSESSMENT — REFRACTION_AUTOREFRACTION
OD_SPHERE: +0.50
OD_CYLINDER: -2.50
OS_CYLINDER: -2.25
OS_AXIS: 090
OS_SPHERE: +1.00
OD_AXIS: 113

## 2023-10-30 ASSESSMENT — REFRACTION_CURRENTRX
OD_OVR_VA: 20/
OS_CYLINDER: -1.50
OS_AXIS: 072
OD_VPRISM_DIRECTION: SV
OD_OVR_VA: 20/
OD_SPHERE: +3.00
OD_VPRISM_DIRECTION: SV
OD_CYLINDER: -1.50
OD_SPHERE: +0.25
OD_AXIS: 093
OD_CYLINDER: -1.50
OD_AXIS: 099
OS_SPHERE: +3.50
OS_OVR_VA: 20/
OS_SPHERE: +0.50
OS_OVR_VA: 20/
OS_VPRISM_DIRECTION: SV
OS_AXIS: 069
OS_VPRISM_DIRECTION: SV
OS_CYLINDER: -1.25

## 2023-10-30 ASSESSMENT — TONOMETRY
OD_IOP_MMHG: 11
OS_IOP_MMHG: 11

## 2023-10-30 ASSESSMENT — AXIALLENGTH_DERIVED
OD_AL: 22.6707
OS_AL: 24.2498
OS_AL: 24.3531
OD_AL: 22.852

## 2023-10-30 ASSESSMENT — VISUAL ACUITY
OS_BCVA: 20/50-2
OD_BCVA: 20/40-2

## 2023-10-30 ASSESSMENT — CONFRONTATIONAL VISUAL FIELD TEST (CVF)
OS_FINDINGS: FULL
OD_FINDINGS: FULL

## 2023-11-21 RX ORDER — SIMVASTATIN 40 MG/1
40 TABLET, FILM COATED ORAL
Qty: 90 | Refills: 2 | Status: ACTIVE | COMMUNITY
Start: 2020-09-29 | End: 1900-01-01

## 2023-12-26 RX ORDER — PROPRANOLOL HYDROCHLORIDE 20 MG/1
20 TABLET ORAL
Qty: 180 | Refills: 3 | Status: ACTIVE | COMMUNITY
Start: 2023-01-03 | End: 1900-01-01

## 2024-02-09 ENCOUNTER — NON-APPOINTMENT (OUTPATIENT)
Age: 79
End: 2024-02-09

## 2024-02-15 ENCOUNTER — APPOINTMENT (OUTPATIENT)
Dept: MRI IMAGING | Facility: CLINIC | Age: 79
End: 2024-02-15
Payer: MEDICARE

## 2024-02-15 PROCEDURE — 70553 MRI BRAIN STEM W/O & W/DYE: CPT

## 2024-02-15 PROCEDURE — A9585: CPT | Mod: JW

## 2024-04-19 ENCOUNTER — APPOINTMENT (OUTPATIENT)
Dept: UROLOGY | Facility: CLINIC | Age: 79
End: 2024-04-19
Payer: MEDICARE

## 2024-04-19 VITALS
TEMPERATURE: 98 F | HEART RATE: 52 BPM | DIASTOLIC BLOOD PRESSURE: 60 MMHG | HEIGHT: 72 IN | BODY MASS INDEX: 28.17 KG/M2 | SYSTOLIC BLOOD PRESSURE: 88 MMHG | WEIGHT: 208 LBS

## 2024-04-19 VITALS — DIASTOLIC BLOOD PRESSURE: 66 MMHG | SYSTOLIC BLOOD PRESSURE: 96 MMHG | HEART RATE: 60 BPM

## 2024-04-19 DIAGNOSIS — R39.9 UNSPECIFIED SYMPTOMS AND SIGNS INVOLVING THE GENITOURINARY SYSTEM: ICD-10-CM

## 2024-04-19 PROCEDURE — 99213 OFFICE O/P EST LOW 20 MIN: CPT

## 2024-07-11 ENCOUNTER — APPOINTMENT (OUTPATIENT)
Dept: CARDIOLOGY | Facility: CLINIC | Age: 79
End: 2024-07-11
Payer: MEDICARE

## 2024-07-11 VITALS
OXYGEN SATURATION: 94 % | SYSTOLIC BLOOD PRESSURE: 102 MMHG | HEART RATE: 67 BPM | WEIGHT: 202 LBS | BODY MASS INDEX: 27.4 KG/M2 | DIASTOLIC BLOOD PRESSURE: 56 MMHG

## 2024-07-11 DIAGNOSIS — I25.10 ATHEROSCLEROTIC HEART DISEASE OF NATIVE CORONARY ARTERY W/OUT ANGINA PECTORIS: ICD-10-CM

## 2024-07-11 PROCEDURE — 99213 OFFICE O/P EST LOW 20 MIN: CPT

## 2024-08-02 ENCOUNTER — APPOINTMENT (OUTPATIENT)
Dept: RADIOLOGY | Facility: CLINIC | Age: 79
End: 2024-08-02

## 2024-08-02 PROCEDURE — 72072 X-RAY EXAM THORAC SPINE 3VWS: CPT

## 2024-08-02 PROCEDURE — 72110 X-RAY EXAM L-2 SPINE 4/>VWS: CPT

## 2024-09-17 ENCOUNTER — APPOINTMENT (OUTPATIENT)
Dept: UROLOGY | Facility: CLINIC | Age: 79
End: 2024-09-17
Payer: MEDICARE

## 2024-09-17 VITALS
SYSTOLIC BLOOD PRESSURE: 126 MMHG | HEIGHT: 72 IN | WEIGHT: 202 LBS | HEART RATE: 79 BPM | BODY MASS INDEX: 27.36 KG/M2 | TEMPERATURE: 97.3 F | DIASTOLIC BLOOD PRESSURE: 80 MMHG

## 2024-09-17 DIAGNOSIS — R33.8 OTHER RETENTION OF URINE: ICD-10-CM

## 2024-09-17 DIAGNOSIS — R39.9 UNSPECIFIED SYMPTOMS AND SIGNS INVOLVING THE GENITOURINARY SYSTEM: ICD-10-CM

## 2024-09-17 PROCEDURE — 99213 OFFICE O/P EST LOW 20 MIN: CPT

## 2024-09-17 NOTE — PHYSICAL EXAM
[Normal Appearance] : normal appearance [Well Groomed] : well groomed [General Appearance - In No Acute Distress] : no acute distress [Edema] : no peripheral edema [Respiration, Rhythm And Depth] : normal respiratory rhythm and effort [Exaggerated Use Of Accessory Muscles For Inspiration] : no accessory muscle use [Abdomen Soft] : soft [Abdomen Tenderness] : non-tender [Costovertebral Angle Tenderness] : no ~M costovertebral angle tenderness [Normal Station and Gait] : the gait and station were normal for the patient's age [Urinary Bladder Findings] : the bladder was normal on palpation [No Focal Deficits] : no focal deficits [] : no rash [Oriented To Time, Place, And Person] : oriented to person, place, and time [Affect] : the affect was normal [Mood] : the mood was normal

## 2024-09-17 NOTE — ASSESSMENT
[FreeTextEntry1] : PLAN -CW Dutasteride and Flomax 2 cap.  -UA, UCX, BMP -FU in 1wk for PVR -Advised to decrease ETOH (beer) intake.

## 2024-09-17 NOTE — HISTORY OF PRESENT ILLNESS
[FreeTextEntry1] : Pt comes in follow up LUTS. On Dutasteride and Flomax 2 cap.  ED on 9/16/24 for urinary retention.   UA (-), Urbc 6.  Cr. 1.4 on 9/17/24 (downtrend from 1.8 on 9/16/24)  CT A&P:  KIDNEYS/URETERS: There is no urinary tract obstruction or stone identified. There is symmetric bilateral perinephric stranding, nonspecific though may indicate underlying nephritis or nephropathy. Correlate clinically. BLADDER: Sr catheter. REPRODUCTIVE ORGANS: Marked prostate enlargement.  Received Cefepime in ED.  Pt report Sr removed today 9/17/24. Able to urinate today. NO pain or dysuria or gross hematuria.  PVR 16cc. 9/17/24

## 2024-09-24 ENCOUNTER — RX RENEWAL (OUTPATIENT)
Age: 79
End: 2024-09-24

## 2024-10-01 ENCOUNTER — APPOINTMENT (OUTPATIENT)
Dept: UROLOGY | Facility: CLINIC | Age: 79
End: 2024-10-01
Payer: MEDICARE

## 2024-10-01 VITALS
WEIGHT: 202 LBS | DIASTOLIC BLOOD PRESSURE: 53 MMHG | HEART RATE: 75 BPM | TEMPERATURE: 97.3 F | HEIGHT: 72 IN | BODY MASS INDEX: 27.36 KG/M2 | SYSTOLIC BLOOD PRESSURE: 86 MMHG

## 2024-10-01 DIAGNOSIS — R39.9 UNSPECIFIED SYMPTOMS AND SIGNS INVOLVING THE GENITOURINARY SYSTEM: ICD-10-CM

## 2024-10-01 PROCEDURE — 99213 OFFICE O/P EST LOW 20 MIN: CPT

## 2024-10-01 PROCEDURE — 51798 US URINE CAPACITY MEASURE: CPT

## 2024-10-01 NOTE — ASSESSMENT
[FreeTextEntry1] : PLAN -CW Dutasteride and Flomax 2 cap.  fu with Nephrology for elevated Cr -fu 6 months

## 2024-10-08 ENCOUNTER — APPOINTMENT (OUTPATIENT)
Dept: UROLOGY | Facility: CLINIC | Age: 79
End: 2024-10-08

## 2024-10-28 ENCOUNTER — NON-APPOINTMENT (OUTPATIENT)
Age: 79
End: 2024-10-28

## 2025-04-15 ENCOUNTER — APPOINTMENT (OUTPATIENT)
Dept: UROLOGY | Facility: CLINIC | Age: 80
End: 2025-04-15
Payer: MEDICARE

## 2025-04-15 VITALS
BODY MASS INDEX: 27.22 KG/M2 | HEART RATE: 68 BPM | WEIGHT: 201 LBS | SYSTOLIC BLOOD PRESSURE: 101 MMHG | TEMPERATURE: 97.6 F | DIASTOLIC BLOOD PRESSURE: 67 MMHG | HEIGHT: 72 IN

## 2025-04-15 DIAGNOSIS — R39.9 UNSPECIFIED SYMPTOMS AND SIGNS INVOLVING THE GENITOURINARY SYSTEM: ICD-10-CM

## 2025-04-15 PROCEDURE — 99213 OFFICE O/P EST LOW 20 MIN: CPT

## 2025-05-28 ENCOUNTER — NON-APPOINTMENT (OUTPATIENT)
Age: 80
End: 2025-05-28

## 2025-05-28 ENCOUNTER — APPOINTMENT (OUTPATIENT)
Dept: CARDIOLOGY | Facility: CLINIC | Age: 80
End: 2025-05-28
Payer: MEDICARE

## 2025-05-28 VITALS
OXYGEN SATURATION: 99 % | SYSTOLIC BLOOD PRESSURE: 90 MMHG | BODY MASS INDEX: 27.26 KG/M2 | WEIGHT: 201 LBS | HEART RATE: 71 BPM | DIASTOLIC BLOOD PRESSURE: 60 MMHG

## 2025-05-28 DIAGNOSIS — R06.02 SHORTNESS OF BREATH: ICD-10-CM

## 2025-05-28 DIAGNOSIS — C34.90 MALIGNANT NEOPLASM OF UNSPECIFIED PART OF UNSPECIFIED BRONCHUS OR LUNG: ICD-10-CM

## 2025-05-28 DIAGNOSIS — E78.5 HYPERLIPIDEMIA, UNSPECIFIED: ICD-10-CM

## 2025-05-28 DIAGNOSIS — I25.10 ATHEROSCLEROTIC HEART DISEASE OF NATIVE CORONARY ARTERY W/OUT ANGINA PECTORIS: ICD-10-CM

## 2025-05-28 DIAGNOSIS — I50.22 CHRONIC SYSTOLIC (CONGESTIVE) HEART FAILURE: ICD-10-CM

## 2025-05-28 DIAGNOSIS — Z95.5 PRESENCE OF CORONARY ANGIOPLASTY IMPLANT AND GRAFT: ICD-10-CM

## 2025-05-28 DIAGNOSIS — G20.A1 PARKINSON'S DISEASE WITHOUT DYSKINESIA, WITHOUT MENTION OF FLUCTUATIONS: ICD-10-CM

## 2025-05-28 PROCEDURE — 99214 OFFICE O/P EST MOD 30 MIN: CPT

## 2025-05-28 PROCEDURE — 99204 OFFICE O/P NEW MOD 45 MIN: CPT

## 2025-05-28 PROCEDURE — 93000 ELECTROCARDIOGRAM COMPLETE: CPT

## 2025-05-28 RX ORDER — DICYCLOMINE HYDROCHLORIDE 20 MG/1
20 TABLET ORAL TWICE DAILY
Refills: 0 | Status: ACTIVE | COMMUNITY

## 2025-07-07 ENCOUNTER — APPOINTMENT (OUTPATIENT)
Dept: CARDIOLOGY | Facility: CLINIC | Age: 80
End: 2025-07-07
Payer: MEDICARE

## 2025-07-07 PROCEDURE — 93306 TTE W/DOPPLER COMPLETE: CPT

## 2025-07-08 ENCOUNTER — APPOINTMENT (OUTPATIENT)
Dept: CARDIOLOGY | Facility: CLINIC | Age: 80
End: 2025-07-08
Payer: MEDICARE

## 2025-07-08 VITALS
HEIGHT: 72 IN | SYSTOLIC BLOOD PRESSURE: 86 MMHG | BODY MASS INDEX: 26.68 KG/M2 | HEART RATE: 59 BPM | OXYGEN SATURATION: 97 % | DIASTOLIC BLOOD PRESSURE: 56 MMHG | WEIGHT: 197 LBS

## 2025-07-08 PROBLEM — I95.9 HYPOTENSION: Status: ACTIVE | Noted: 2025-07-08

## 2025-07-08 PROBLEM — R41.82 AMS (ALTERED MENTAL STATUS): Status: ACTIVE | Noted: 2025-07-08

## 2025-07-08 PROCEDURE — 99214 OFFICE O/P EST MOD 30 MIN: CPT

## 2025-07-21 ENCOUNTER — APPOINTMENT (OUTPATIENT)
Dept: CARDIOLOGY | Facility: CLINIC | Age: 80
End: 2025-07-21
Payer: MEDICARE

## 2025-07-21 PROCEDURE — 93880 EXTRACRANIAL BILAT STUDY: CPT

## 2025-08-04 ENCOUNTER — RX RENEWAL (OUTPATIENT)
Age: 80
End: 2025-08-04

## 2025-08-07 ENCOUNTER — APPOINTMENT (OUTPATIENT)
Dept: CARDIOLOGY | Facility: CLINIC | Age: 80
End: 2025-08-07

## 2025-08-07 ENCOUNTER — NON-APPOINTMENT (OUTPATIENT)
Age: 80
End: 2025-08-07